# Patient Record
Sex: FEMALE | Race: WHITE | NOT HISPANIC OR LATINO | Employment: OTHER | URBAN - METROPOLITAN AREA
[De-identification: names, ages, dates, MRNs, and addresses within clinical notes are randomized per-mention and may not be internally consistent; named-entity substitution may affect disease eponyms.]

---

## 2016-07-11 LAB
A1C: 6.2
CHOL/HDLC RATIO: 2.2
CHOLESTEROL, TOTAL: 104
HDLC SERPL-MCNC: 46 MG/DL
LDLC SERPL CALC-MCNC: 25 MG/DL
NON HDL CHOL. (LDL+VLDL): 57
TRIGL SERPL-MCNC: 158 MG/DL

## 2017-01-01 ENCOUNTER — HOSPITAL ENCOUNTER (OUTPATIENT)
Dept: RADIOLOGY | Facility: HOSPITAL | Age: 77
Discharge: HOME | End: 2017-10-03
Attending: INTERNAL MEDICINE
Payer: MEDICARE

## 2017-01-01 ENCOUNTER — TELEPHONE (OUTPATIENT)
Dept: FAMILY MEDICINE | Facility: CLINIC | Age: 77
End: 2017-01-01

## 2017-01-01 ENCOUNTER — HOSPITAL ENCOUNTER (OUTPATIENT)
Dept: RADIOLOGY | Facility: HOSPITAL | Age: 77
Discharge: HOME | End: 2017-09-18
Attending: INTERNAL MEDICINE
Payer: MEDICARE

## 2017-01-01 ENCOUNTER — TELEPHONE (OUTPATIENT)
Dept: ADMINISTRATIVE | Facility: HOSPITAL | Age: 77
End: 2017-01-01

## 2017-01-01 ENCOUNTER — LAB VISIT (OUTPATIENT)
Dept: LAB | Facility: HOSPITAL | Age: 77
End: 2017-01-01
Attending: INTERNAL MEDICINE
Payer: MEDICARE

## 2017-01-01 ENCOUNTER — HOSPITAL ENCOUNTER (OUTPATIENT)
Dept: RADIOLOGY | Facility: HOSPITAL | Age: 77
Discharge: HOME | End: 2017-09-07
Attending: NURSE PRACTITIONER
Payer: MEDICARE

## 2017-01-01 ENCOUNTER — OFFICE VISIT (OUTPATIENT)
Dept: NEPHROLOGY | Facility: CLINIC | Age: 77
End: 2017-01-01
Payer: MEDICARE

## 2017-01-01 ENCOUNTER — HOSPITAL ENCOUNTER (OUTPATIENT)
Dept: RADIOLOGY | Facility: HOSPITAL | Age: 77
Discharge: HOME | End: 2017-08-02
Attending: FAMILY MEDICINE
Payer: MEDICARE

## 2017-01-01 ENCOUNTER — OFFICE VISIT (OUTPATIENT)
Dept: FAMILY MEDICINE | Facility: CLINIC | Age: 77
End: 2017-01-01
Payer: MEDICARE

## 2017-01-01 ENCOUNTER — TELEPHONE (OUTPATIENT)
Dept: NEPHROLOGY | Facility: CLINIC | Age: 77
End: 2017-01-01

## 2017-01-01 ENCOUNTER — OFFICE VISIT (OUTPATIENT)
Dept: UROLOGY | Facility: CLINIC | Age: 77
End: 2017-01-01
Payer: MEDICARE

## 2017-01-01 ENCOUNTER — PATIENT OUTREACH (OUTPATIENT)
Dept: ADMINISTRATIVE | Facility: HOSPITAL | Age: 77
End: 2017-01-01

## 2017-01-01 VITALS
DIASTOLIC BLOOD PRESSURE: 70 MMHG | OXYGEN SATURATION: 98 % | SYSTOLIC BLOOD PRESSURE: 150 MMHG | TEMPERATURE: 98 F | HEIGHT: 60 IN | WEIGHT: 206.38 LBS | HEART RATE: 71 BPM | RESPIRATION RATE: 20 BRPM | BODY MASS INDEX: 40.52 KG/M2

## 2017-01-01 VITALS
HEIGHT: 60 IN | DIASTOLIC BLOOD PRESSURE: 68 MMHG | BODY MASS INDEX: 39.82 KG/M2 | SYSTOLIC BLOOD PRESSURE: 122 MMHG | WEIGHT: 202.81 LBS | HEART RATE: 74 BPM

## 2017-01-01 VITALS
SYSTOLIC BLOOD PRESSURE: 146 MMHG | OXYGEN SATURATION: 97 % | HEART RATE: 76 BPM | DIASTOLIC BLOOD PRESSURE: 72 MMHG | WEIGHT: 202.81 LBS | HEIGHT: 60 IN | BODY MASS INDEX: 39.82 KG/M2

## 2017-01-01 VITALS
HEIGHT: 60 IN | BODY MASS INDEX: 39.99 KG/M2 | HEART RATE: 67 BPM | OXYGEN SATURATION: 97 % | DIASTOLIC BLOOD PRESSURE: 76 MMHG | WEIGHT: 203.69 LBS | SYSTOLIC BLOOD PRESSURE: 130 MMHG

## 2017-01-01 VITALS
SYSTOLIC BLOOD PRESSURE: 130 MMHG | HEART RATE: 66 BPM | RESPIRATION RATE: 20 BRPM | BODY MASS INDEX: 40.52 KG/M2 | DIASTOLIC BLOOD PRESSURE: 78 MMHG | HEIGHT: 60 IN | WEIGHT: 206.38 LBS

## 2017-01-01 DIAGNOSIS — Z13.820 OSTEOPOROSIS SCREENING: ICD-10-CM

## 2017-01-01 DIAGNOSIS — N28.89 RENAL MASS: Primary | ICD-10-CM

## 2017-01-01 DIAGNOSIS — N28.89 RENAL MASS: ICD-10-CM

## 2017-01-01 DIAGNOSIS — N18.30 CKD (CHRONIC KIDNEY DISEASE) STAGE 3, GFR 30-59 ML/MIN: ICD-10-CM

## 2017-01-01 DIAGNOSIS — E11.00 TYPE 2 DIABETES MELLITUS WITH HYPEROSMOLARITY WITHOUT COMA, UNSPECIFIED LONG TERM INSULIN USE STATUS: Primary | ICD-10-CM

## 2017-01-01 DIAGNOSIS — N18.30 CKD (CHRONIC KIDNEY DISEASE) STAGE 3, GFR 30-59 ML/MIN: Primary | ICD-10-CM

## 2017-01-01 DIAGNOSIS — I10 ESSENTIAL HYPERTENSION: ICD-10-CM

## 2017-01-01 DIAGNOSIS — M79.671 RIGHT FOOT PAIN: Primary | ICD-10-CM

## 2017-01-01 DIAGNOSIS — E66.01 MORBID OBESITY DUE TO EXCESS CALORIES: ICD-10-CM

## 2017-01-01 DIAGNOSIS — I10 ESSENTIAL HYPERTENSION: Primary | ICD-10-CM

## 2017-01-01 DIAGNOSIS — M79.671 RIGHT FOOT PAIN: ICD-10-CM

## 2017-01-01 DIAGNOSIS — E79.0 ELEVATED URIC ACID IN BLOOD: Primary | ICD-10-CM

## 2017-01-01 DIAGNOSIS — E11.9 TYPE 2 DIABETES MELLITUS WITHOUT COMPLICATION, WITHOUT LONG-TERM CURRENT USE OF INSULIN: ICD-10-CM

## 2017-01-01 DIAGNOSIS — Z13.5 DIABETIC RETINOPATHY SCREENING: ICD-10-CM

## 2017-01-01 DIAGNOSIS — E11.9 TYPE 2 DIABETES MELLITUS WITHOUT COMPLICATION: ICD-10-CM

## 2017-01-01 DIAGNOSIS — N28.1 COMPLEX RENAL CYST: ICD-10-CM

## 2017-01-01 DIAGNOSIS — E11.42 DIABETIC POLYNEUROPATHY ASSOCIATED WITH TYPE 2 DIABETES MELLITUS: ICD-10-CM

## 2017-01-01 DIAGNOSIS — E11.21 TYPE 2 DIABETES MELLITUS WITH DIABETIC NEPHROPATHY, WITHOUT LONG-TERM CURRENT USE OF INSULIN: ICD-10-CM

## 2017-01-01 DIAGNOSIS — N28.89 RIGHT RENAL MASS: Primary | ICD-10-CM

## 2017-01-01 LAB
A1C: 5.7
ANION GAP SERPL CALC-SCNC: 7 MMOL/L
BILIRUB SERPL-MCNC: NORMAL MG/DL
BLOOD URINE, POC: NORMAL
BUN SERPL-MCNC: 33 MG/DL
CALCIUM SERPL-MCNC: 9.7 MG/DL
CHLORIDE SERPL-SCNC: 105 MMOL/L
CHOL/HDLC RATIO: 2.4
CHOLESTEROL, TOTAL: 115
CO2 SERPL-SCNC: 28 MMOL/L
COLOR, POC UA: YELLOW
CREAT SERPL-MCNC: 1.3 MG/DL
CREATININE RANDOM URINE: 91 MG/DL
EST. GFR  (AFRICAN AMERICAN): 45.4 ML/MIN/1.73 M^2
EST. GFR  (NON AFRICAN AMERICAN): 39.4 ML/MIN/1.73 M^2
GLUCOSE SERPL-MCNC: 124 MG/DL
GLUCOSE UR QL STRIP: NORMAL
HDLC SERPL-MCNC: 48 MG/DL
KETONES UR QL STRIP: NORMAL
LDLC SERPL CALC-MCNC: 44 MG/DL
LEUKOCYTE ESTERASE URINE, POC: NORMAL
MICROALBUMIN URINE: 0.7
MICROALBUMIN/CREATININE RATIO: 8
NITRITE, POC UA: NORMAL
NON HDL CHOL. (LDL+VLDL): 67
PH, POC UA: 5
POTASSIUM SERPL-SCNC: 4.1 MMOL/L
PROTEIN, POC: NORMAL
SODIUM SERPL-SCNC: 140 MMOL/L
SPECIFIC GRAVITY, POC UA: 1.01
TRIGLYCERIDE (LIPID PAN): 156
UROBILINOGEN, POC UA: NORMAL

## 2017-01-01 PROCEDURE — 1159F MED LIST DOCD IN RCRD: CPT | Mod: S$GLB,,, | Performed by: FAMILY MEDICINE

## 2017-01-01 PROCEDURE — 99499 UNLISTED E&M SERVICE: CPT | Mod: S$PBB,,, | Performed by: INTERNAL MEDICINE

## 2017-01-01 PROCEDURE — 1125F AMNT PAIN NOTED PAIN PRSNT: CPT | Mod: S$GLB,,, | Performed by: INTERNAL MEDICINE

## 2017-01-01 PROCEDURE — 77080 DXA BONE DENSITY AXIAL: CPT | Mod: TC,PO

## 2017-01-01 PROCEDURE — 36415 COLL VENOUS BLD VENIPUNCTURE: CPT | Mod: PO

## 2017-01-01 PROCEDURE — 99999 PR PBB SHADOW E&M-EST. PATIENT-LVL IV: CPT | Mod: PBBFAC,,, | Performed by: NURSE PRACTITIONER

## 2017-01-01 PROCEDURE — 1126F AMNT PAIN NOTED NONE PRSNT: CPT | Mod: S$GLB,,, | Performed by: FAMILY MEDICINE

## 2017-01-01 PROCEDURE — 73630 X-RAY EXAM OF FOOT: CPT | Mod: TC,PO,RT

## 2017-01-01 PROCEDURE — 3077F SYST BP >= 140 MM HG: CPT | Mod: S$GLB,,, | Performed by: INTERNAL MEDICINE

## 2017-01-01 PROCEDURE — 99203 OFFICE O/P NEW LOW 30 MIN: CPT | Mod: 25,S$GLB,, | Performed by: UROLOGY

## 2017-01-01 PROCEDURE — 99499 UNLISTED E&M SERVICE: CPT | Mod: S$PBB,,, | Performed by: UROLOGY

## 2017-01-01 PROCEDURE — 25500020 PHARM REV CODE 255: Mod: PO | Performed by: INTERNAL MEDICINE

## 2017-01-01 PROCEDURE — 99214 OFFICE O/P EST MOD 30 MIN: CPT | Mod: S$GLB,,, | Performed by: INTERNAL MEDICINE

## 2017-01-01 PROCEDURE — 3078F DIAST BP <80 MM HG: CPT | Mod: S$GLB,,, | Performed by: NURSE PRACTITIONER

## 2017-01-01 PROCEDURE — 77080 DXA BONE DENSITY AXIAL: CPT | Mod: 26,,, | Performed by: RADIOLOGY

## 2017-01-01 PROCEDURE — 3074F SYST BP LT 130 MM HG: CPT | Mod: S$GLB,,, | Performed by: INTERNAL MEDICINE

## 2017-01-01 PROCEDURE — 99499 UNLISTED E&M SERVICE: CPT | Mod: S$PBB,,, | Performed by: FAMILY MEDICINE

## 2017-01-01 PROCEDURE — 3008F BODY MASS INDEX DOCD: CPT | Mod: S$GLB,,, | Performed by: NURSE PRACTITIONER

## 2017-01-01 PROCEDURE — 3078F DIAST BP <80 MM HG: CPT | Mod: S$GLB,,, | Performed by: INTERNAL MEDICINE

## 2017-01-01 PROCEDURE — 81002 URINALYSIS NONAUTO W/O SCOPE: CPT | Mod: S$GLB,,, | Performed by: UROLOGY

## 2017-01-01 PROCEDURE — 74178 CT ABD&PLV WO CNTR FLWD CNTR: CPT | Mod: TC,PO

## 2017-01-01 PROCEDURE — 73630 X-RAY EXAM OF FOOT: CPT | Mod: 26,RT,, | Performed by: RADIOLOGY

## 2017-01-01 PROCEDURE — 99999 PR PBB SHADOW E&M-EST. PATIENT-LVL III: CPT | Mod: PBBFAC,,, | Performed by: FAMILY MEDICINE

## 2017-01-01 PROCEDURE — 99999 PR PBB SHADOW E&M-EST. PATIENT-LVL III: CPT | Mod: PBBFAC,,, | Performed by: UROLOGY

## 2017-01-01 PROCEDURE — 99999 PR PBB SHADOW E&M-EST. PATIENT-LVL IV: CPT | Mod: PBBFAC,,, | Performed by: INTERNAL MEDICINE

## 2017-01-01 PROCEDURE — 74178 CT ABD&PLV WO CNTR FLWD CNTR: CPT | Mod: 26,,, | Performed by: RADIOLOGY

## 2017-01-01 PROCEDURE — 99213 OFFICE O/P EST LOW 20 MIN: CPT | Mod: S$GLB,,, | Performed by: NURSE PRACTITIONER

## 2017-01-01 PROCEDURE — 80048 BASIC METABOLIC PNL TOTAL CA: CPT

## 2017-01-01 PROCEDURE — 99214 OFFICE O/P EST MOD 30 MIN: CPT | Mod: S$GLB,,, | Performed by: FAMILY MEDICINE

## 2017-01-01 PROCEDURE — 1159F MED LIST DOCD IN RCRD: CPT | Mod: S$GLB,,, | Performed by: INTERNAL MEDICINE

## 2017-01-01 PROCEDURE — 3008F BODY MASS INDEX DOCD: CPT | Mod: S$GLB,,, | Performed by: INTERNAL MEDICINE

## 2017-01-01 PROCEDURE — 1159F MED LIST DOCD IN RCRD: CPT | Mod: S$GLB,,, | Performed by: NURSE PRACTITIONER

## 2017-01-01 PROCEDURE — 76770 US EXAM ABDO BACK WALL COMP: CPT | Mod: 26,,, | Performed by: RADIOLOGY

## 2017-01-01 PROCEDURE — 3075F SYST BP GE 130 - 139MM HG: CPT | Mod: S$GLB,,, | Performed by: NURSE PRACTITIONER

## 2017-01-01 PROCEDURE — 1125F AMNT PAIN NOTED PAIN PRSNT: CPT | Mod: S$GLB,,, | Performed by: NURSE PRACTITIONER

## 2017-01-01 PROCEDURE — 76770 US EXAM ABDO BACK WALL COMP: CPT | Mod: TC,PO

## 2017-01-01 PROCEDURE — 1126F AMNT PAIN NOTED NONE PRSNT: CPT | Mod: S$GLB,,, | Performed by: INTERNAL MEDICINE

## 2017-01-01 RX ORDER — COLCHICINE 0.6 MG/1
TABLET ORAL
Qty: 30 TABLET | Refills: 4 | Status: SHIPPED | OUTPATIENT
Start: 2017-01-01 | End: 2018-01-01

## 2017-01-01 RX ORDER — PIOGLITAZONEHYDROCHLORIDE 15 MG/1
TABLET ORAL
Qty: 90 TABLET | Refills: 1 | Status: SHIPPED | OUTPATIENT
Start: 2017-01-01

## 2017-01-01 RX ORDER — SIMVASTATIN 40 MG/1
TABLET, FILM COATED ORAL
Qty: 90 TABLET | Refills: 2 | Status: SHIPPED | OUTPATIENT
Start: 2017-01-01 | End: 2018-01-01

## 2017-01-01 RX ORDER — IBUPROFEN 400 MG/1
400 TABLET ORAL EVERY 6 HOURS PRN
Status: ON HOLD | COMMUNITY
End: 2017-01-01 | Stop reason: HOSPADM

## 2017-01-01 RX ORDER — HYDRALAZINE HYDROCHLORIDE 10 MG/1
TABLET, FILM COATED ORAL
Qty: 180 TABLET | Refills: 1 | Status: ON HOLD | OUTPATIENT
Start: 2017-01-01 | End: 2017-01-01 | Stop reason: HOSPADM

## 2017-01-01 RX ORDER — HYDRALAZINE HYDROCHLORIDE 10 MG/1
TABLET, FILM COATED ORAL
Qty: 60 TABLET | Refills: 0 | Status: SHIPPED | OUTPATIENT
Start: 2017-01-01 | End: 2017-01-01 | Stop reason: SDUPTHER

## 2017-01-01 RX ORDER — METFORMIN HYDROCHLORIDE 500 MG/1
TABLET ORAL
Qty: 270 TABLET | Refills: 1 | Status: SHIPPED | OUTPATIENT
Start: 2017-01-01 | End: 2017-01-01

## 2017-01-01 RX ORDER — NADOLOL 40 MG/1
TABLET ORAL
Qty: 135 TABLET | Refills: 1 | Status: SHIPPED | OUTPATIENT
Start: 2017-01-01

## 2017-01-01 RX ORDER — ALLOPURINOL 100 MG/1
100 TABLET ORAL DAILY
Qty: 30 TABLET | Refills: 4 | Status: SHIPPED | OUTPATIENT
Start: 2017-01-01

## 2017-01-01 RX ORDER — ESCITALOPRAM OXALATE 10 MG/1
TABLET ORAL
Qty: 90 TABLET | Refills: 1 | Status: ON HOLD | OUTPATIENT
Start: 2017-01-01 | End: 2017-01-01 | Stop reason: HOSPADM

## 2017-01-01 RX ORDER — POTASSIUM CHLORIDE 750 MG/1
TABLET, EXTENDED RELEASE ORAL
Qty: 90 TABLET | Refills: 0 | Status: SHIPPED | OUTPATIENT
Start: 2017-01-01

## 2017-01-01 RX ORDER — MELOXICAM 15 MG/1
15 TABLET ORAL DAILY
Qty: 10 TABLET | Refills: 0 | Status: SHIPPED | OUTPATIENT
Start: 2017-01-01 | End: 2017-01-01

## 2017-01-01 RX ORDER — GLIPIZIDE 5 MG/1
TABLET, FILM COATED, EXTENDED RELEASE ORAL
COMMUNITY
Start: 2017-07-07 | End: 2017-01-01 | Stop reason: SDUPTHER

## 2017-01-01 RX ADMIN — IOHEXOL 30 ML: 350 INJECTION, SOLUTION INTRAVENOUS at 09:10

## 2017-01-01 RX ADMIN — IOHEXOL 100 ML: 350 INJECTION, SOLUTION INTRAVENOUS at 09:10

## 2017-01-06 RX ORDER — POTASSIUM CHLORIDE 750 MG/1
TABLET, EXTENDED RELEASE ORAL
Qty: 90 TABLET | Refills: 0 | Status: SHIPPED | OUTPATIENT
Start: 2017-01-06 | End: 2017-04-08 | Stop reason: SDUPTHER

## 2017-01-06 RX ORDER — INDAPAMIDE 2.5 MG/1
TABLET ORAL
Qty: 180 TABLET | Refills: 0 | Status: SHIPPED | OUTPATIENT
Start: 2017-01-06 | End: 2017-01-01

## 2017-01-09 RX ORDER — METFORMIN HYDROCHLORIDE 500 MG/1
TABLET ORAL
Qty: 270 TABLET | Refills: 0 | Status: SHIPPED | OUTPATIENT
Start: 2017-01-09 | End: 2017-01-01 | Stop reason: SDUPTHER

## 2017-02-02 RX ORDER — SIMVASTATIN 40 MG/1
TABLET, FILM COATED ORAL
Qty: 90 TABLET | Refills: 0 | Status: SHIPPED | OUTPATIENT
Start: 2017-02-02 | End: 2017-05-01 | Stop reason: SDUPTHER

## 2017-02-14 RX ORDER — NADOLOL 40 MG/1
TABLET ORAL
Qty: 135 TABLET | Refills: 0 | Status: SHIPPED | OUTPATIENT
Start: 2017-02-14 | End: 2017-05-18 | Stop reason: SDUPTHER

## 2017-02-25 RX ORDER — PIOGLITAZONEHYDROCHLORIDE 15 MG/1
TABLET ORAL
Qty: 90 TABLET | Refills: 0 | Status: SHIPPED | OUTPATIENT
Start: 2017-02-25 | End: 2017-05-27 | Stop reason: SDUPTHER

## 2017-03-02 ENCOUNTER — OFFICE VISIT (OUTPATIENT)
Dept: FAMILY MEDICINE | Facility: CLINIC | Age: 77
End: 2017-03-02
Payer: MEDICARE

## 2017-03-02 VITALS
BODY MASS INDEX: 40.78 KG/M2 | SYSTOLIC BLOOD PRESSURE: 138 MMHG | OXYGEN SATURATION: 98 % | WEIGHT: 207.69 LBS | DIASTOLIC BLOOD PRESSURE: 72 MMHG | HEIGHT: 60 IN | HEART RATE: 70 BPM

## 2017-03-02 DIAGNOSIS — J32.9 SINUSITIS, UNSPECIFIED CHRONICITY, UNSPECIFIED LOCATION: Primary | ICD-10-CM

## 2017-03-02 PROCEDURE — 1126F AMNT PAIN NOTED NONE PRSNT: CPT | Mod: S$GLB,,, | Performed by: NURSE PRACTITIONER

## 2017-03-02 PROCEDURE — 1159F MED LIST DOCD IN RCRD: CPT | Mod: S$GLB,,, | Performed by: NURSE PRACTITIONER

## 2017-03-02 PROCEDURE — 1157F ADVNC CARE PLAN IN RCRD: CPT | Mod: S$GLB,,, | Performed by: NURSE PRACTITIONER

## 2017-03-02 PROCEDURE — 99999 PR PBB SHADOW E&M-EST. PATIENT-LVL V: CPT | Mod: PBBFAC,,, | Performed by: NURSE PRACTITIONER

## 2017-03-02 PROCEDURE — 3078F DIAST BP <80 MM HG: CPT | Mod: S$GLB,,, | Performed by: NURSE PRACTITIONER

## 2017-03-02 PROCEDURE — 99213 OFFICE O/P EST LOW 20 MIN: CPT | Mod: S$GLB,,, | Performed by: NURSE PRACTITIONER

## 2017-03-02 PROCEDURE — 3075F SYST BP GE 130 - 139MM HG: CPT | Mod: S$GLB,,, | Performed by: NURSE PRACTITIONER

## 2017-03-02 PROCEDURE — 1160F RVW MEDS BY RX/DR IN RCRD: CPT | Mod: S$GLB,,, | Performed by: NURSE PRACTITIONER

## 2017-03-02 RX ORDER — LEVOCETIRIZINE DIHYDROCHLORIDE 5 MG/1
5 TABLET, FILM COATED ORAL NIGHTLY
Qty: 30 TABLET | Refills: 11 | Status: SHIPPED | OUTPATIENT
Start: 2017-03-02 | End: 2017-05-09

## 2017-03-02 RX ORDER — AMOXICILLIN 500 MG/1
500 CAPSULE ORAL 3 TIMES DAILY
Qty: 30 CAPSULE | Refills: 0 | Status: SHIPPED | OUTPATIENT
Start: 2017-03-02 | End: 2017-03-12

## 2017-03-02 RX ORDER — BENZONATATE 200 MG/1
200 CAPSULE ORAL 3 TIMES DAILY PRN
Qty: 30 CAPSULE | Refills: 0 | Status: SHIPPED | OUTPATIENT
Start: 2017-03-02 | End: 2017-03-12

## 2017-03-02 NOTE — PROGRESS NOTES
Subjective:       Patient ID: Delaney Stephenson is a 77 y.o. female.    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  , Disp: , Rfl:   clonazePAM (KLONOPIN) 0.5 MG tablet, TAKE 1/2 TABLET BY MOUTH TWICE DAILY, Disp: 30 tablet, Rfl: 2  escitalopram oxalate (LEXAPRO) 10 MG tablet, Take 1 tablet (10 mg total) by mouth once daily., Disp: 90 tablet, Rfl: 1  escitalopram oxalate (LEXAPRO) 10 MG tablet, TAKE 1 TABLET BY MOUTH EVERY DAY, Disp: 90 tablet, Rfl: 0  fluticasone (FLONASE) 50 mcg/actuation nasal spray, USE TWO SPRAYS IN EACH NOSTRIL AS NEEDED, Disp: 3 Bottle, Rfl: 3  glipiZIDE (GLUCOTROL) 5 MG tablet, Take 5 mg by mouth 2 (two) times daily before meals. EXTENDED RELEASE, Disp: , Rfl:   glipiZIDE (GLUCOTROL) 5 MG TR24, TK 1 T PO QD, Disp: , Rfl: 1  hydrALAZINE (APRESOLINE) 10 MG tablet, TAKE 1 TABLET BY MOUTH TWICE DAILY, Disp: 60 tablet, Rfl: 3  indapamide (LOZOL) 2.5 MG Tab, TAKE 1 TABLET BY MOUTH TWICE DAILY, Disp: 180 tablet, Rfl: 1  indapamide (LOZOL) 2.5 MG Tab, TAKE 1 TABLET BY MOUTH TWICE DAILY, Disp: 180 tablet, Rfl: 0  metformin (GLUCOPHAGE) 500 MG tablet, TAKE 1 TABLET BY MOUTH THREE TIMES DAILY WITH MEALS, Disp: 270 tablet,  Rfl: 0  montelukast (SINGULAIR) 10 mg tablet, Take 1 tablet (10 mg total) by mouth every evening., Disp: 90 tablet, Rfl: 1  nadolol (CORGARD) 40 MG tablet, Take 1.5 tablets (60 mg total) by mouth once daily., Disp: 135 tablet, Rfl: 1  nadolol (CORGARD) 40 MG tablet, TAKE 1& 1/2 TABLETS BY MOUTH EVERY DAY, Disp: 135 tablet, Rfl: 0  pioglitazone (ACTOS) 15 MG tablet, TAKE 1 TABLET BY MOUTH EVERY DAY, Disp: 90 tablet, Rfl: 0  potassium chloride SA (K-DUR,KLOR-CON) 10 MEQ tablet, TAKE 1 TABLET BY MOUTH ONCE DAILY, Disp: 90 tablet, Rfl: 0  ranitidine (ZANTAC) 150 MG tablet, Take 1 tablet (150 mg total) by mouth 2 (two) times daily., Disp: 180 tablet, Rfl: 1  ranitidine (ZANTAC) 150 MG tablet, TAKE 1 TABLET BY MOUTH TWICE DAILY, Disp: 180 tablet, Rfl: 1  SENNA/FENNEL (NATURAL VEGETABLE LAXATIVE ORAL), Take 3 tablets by mouth 2 (two) times daily., Disp: , Rfl:   simvastatin (ZOCOR) 40 MG tablet, Take 1 tablet (40 mg total) by mouth every evening., Disp: 90 tablet, Rfl: 1  simvastatin (ZOCOR) 40 MG tablet, TAKE 1 TABLET BY MOUTH EVERY EVENING, Disp: 90 tablet, Rfl: 0  triamcinolone acetonide 0.1% (KENALOG) 0.1 % cream, Apply topically 2 (two) times daily., Disp: 60 g, Rfl: 3  (DISCONTINUED) hydrALAZINE (APRESOLINE) 10 MG tablet, TAKE 1 TABLET(10 MG) BY MOUTH TWICE DAILY, Disp: 180 tablet, Rfl: 0    No current facility-administered medications on file prior to visit.     Social History    Marital status:              Spouse name:                       Years of education:                 Number of children:               Occupational History    None on file    Social History Main Topics    Smoking status: Never Smoker                                                                Smokeless status: Never Used                        Alcohol use: Not on file     Drug use: Not on file     Sexual activity: Not on file          Other Topics            Concern    None on file    Social History Narrative    None on  file      Review of patient's family history indicates:    Heart disease                  Mother                          Sinus Problem   This is a new problem. Episode onset: x 12 days. The problem has been gradually worsening since onset. There has been no fever. Associated symptoms include congestion, coughing (productive, thick, yellow), headaches, a hoarse voice, sinus pressure (yellow mucus) and sneezing. Pertinent negatives include no chills, diaphoresis, ear pain, neck pain, shortness of breath, sore throat or swollen glands. Treatments tried: flonase. The treatment provided mild relief.     Review of Systems   Constitutional: Negative for chills and diaphoresis.   HENT: Positive for congestion, hoarse voice, postnasal drip, rhinorrhea, sinus pressure (yellow mucus) and sneezing. Negative for ear pain and sore throat.    Respiratory: Positive for cough (productive, thick, yellow). Negative for chest tightness and shortness of breath.    Cardiovascular: Negative.    Gastrointestinal: Negative.  Negative for constipation, diarrhea, nausea and vomiting.   Genitourinary: Negative.    Musculoskeletal: Negative for neck pain.   Neurological: Positive for headaches. Negative for dizziness.       Objective:      Physical Exam   Constitutional: She is oriented to person, place, and time. No distress.   HENT:   Head: Normocephalic and atraumatic. Head is without raccoon's eyes.   Right Ear: A middle ear effusion is present.   Left Ear: A middle ear effusion is present.   Nose: Mucosal edema and rhinorrhea present. Right sinus exhibits frontal sinus tenderness. Right sinus exhibits no maxillary sinus tenderness. Left sinus exhibits frontal sinus tenderness. Left sinus exhibits no maxillary sinus tenderness.   Mouth/Throat: Uvula is midline. No posterior oropharyngeal erythema.   Eyes: Pupils are equal, round, and reactive to light.   Neck: Normal range of motion.   Cardiovascular: Normal rate and regular rhythm.     Pulmonary/Chest: Effort normal and breath sounds normal. No respiratory distress. She has no wheezes.   Abdominal: Soft. Bowel sounds are normal. She exhibits no distension. There is no tenderness.   Musculoskeletal: She exhibits no edema.   Neurological: She is alert and oriented to person, place, and time.   Skin: She is not diaphoretic.   Psychiatric: She has a normal mood and affect.   Vitals reviewed.      Assessment:       1. Sinusitis, unspecified chronicity, unspecified location        Plan:       1. Sinusitis, unspecified chronicity, unspecified location  Continue flonase daily. Follow up if not resolving.   - levocetirizine (XYZAL) 5 MG tablet; Take 1 tablet (5 mg total) by mouth every evening.  Dispense: 30 tablet; Refill: 11  - amoxicillin (AMOXIL) 500 MG capsule; Take 1 capsule (500 mg total) by mouth 3 (three) times daily.  Dispense: 30 capsule; Refill: 0  - benzonatate (TESSALON) 200 MG capsule; Take 1 capsule (200 mg total) by mouth 3 (three) times daily as needed for Cough.  Dispense: 30 capsule; Refill: 0

## 2017-03-02 NOTE — MR AVS SNAPSHOT
[facility]  [address] [facility]  [facility] [address]  Phone: [phone]  Fax: [phone]                  Delaney Stephenson   3/2/2017 1:00 PM   Office Visit    Description:  Female : 1940   Provider:  Michelle Pa NP   Department:  [facility]           Reason for Visit     Sinus Problem     Cough           Diagnoses this Visit        Comments    Sinusitis, unspecified chronicity, unspecified location    -  Primary            To Do List           Goals (5 Years of Data)     None       These Medications        Disp Refills Start End    levocetirizine (XYZAL) 5 MG tablet 30 tablet 11 3/2/2017 3/2/2018    Take 1 tablet (5 mg total) by mouth every evening. - Oral    Pharmacy: [facility] [address] Ph #: [phone]       amoxicillin (AMOXIL) 500 MG capsule 30 capsule 0 3/2/2017 3/12/2017    Take 1 capsule (500 mg total) by mouth 3 (three) times daily. - Oral    Pharmacy: [facility] [place] [address] [facility] [address] Ph #: [phone]       benzonatate (TESSALON) 200 MG capsule 30 capsule 0 3/2/2017 3/12/2017    Take 1 capsule (200 mg total) by mouth 3 (three) times daily as needed for Cough. - Oral    Pharmacy: [facility] [place] [address] [facility] [address] Ph #: [phone]         [facility]     [facility] Nurse Care Line -  Assistance  Registered nurses in the [facility] provide clinical advisement, health education, appointment booking, and other advisory services.  Call for this free service at [phone].             Medications           Message regarding Medications     Verify the changes and/or additions to your medication regime listed below are the same as discussed with your clinician today.  If any of these changes or additions are incorrect, please notify your  healthcare provider.        START taking these NEW medications        Refills    levocetirizine (XYZAL) 5 MG tablet 11    Sig: Take 1 tablet (5 mg total) by mouth every evening.    Class: Print    Route: Oral    amoxicillin (AMOXIL) 500 MG capsule 0    Sig: Take 1 capsule (500 mg total) by mouth 3 (three) times daily.    Class: Print    Route: Oral    benzonatate (TESSALON) 200 MG capsule 0    Sig: Take 1 capsule (200 mg total) by mouth 3 (three) times daily as needed for Cough.    Class: Print    Route: Oral           Verify that the below list of medications is an accurate representation of the medications you are currently taking.  If none reported, the list may be blank. If incorrect, please contact your healthcare provider. Carry this list with you in case of emergency.           Current Medications     aspirin (ECOTRIN) 81 MG EC tablet Take 81 mg by mouth once daily.      clonazePAM (KLONOPIN) 0.5 MG tablet TAKE 1/2 TABLET BY MOUTH TWICE DAILY    escitalopram oxalate (LEXAPRO) 10 MG tablet Take 1 tablet (10 mg total) by mouth once daily.    escitalopram oxalate (LEXAPRO) 10 MG tablet TAKE 1 TABLET BY MOUTH EVERY DAY    fluticasone (FLONASE) 50 mcg/actuation nasal spray USE TWO SPRAYS IN EACH NOSTRIL AS NEEDED    glipiZIDE (GLUCOTROL) 5 MG tablet Take 5 mg by mouth 2 (two) times daily before meals. EXTENDED RELEASE    glipiZIDE (GLUCOTROL) 5 MG TR24 TK 1 T PO QD    hydrALAZINE (APRESOLINE) 10 MG tablet TAKE 1 TABLET BY MOUTH TWICE DAILY    indapamide (LOZOL) 2.5 MG Tab TAKE 1 TABLET BY MOUTH TWICE DAILY    indapamide (LOZOL) 2.5 MG Tab TAKE 1 TABLET BY MOUTH TWICE DAILY    metformin (GLUCOPHAGE) 500 MG tablet TAKE 1 TABLET BY MOUTH THREE TIMES DAILY WITH MEALS    montelukast (SINGULAIR) 10 mg tablet Take 1 tablet (10 mg total) by mouth every evening.    nadolol (CORGARD) 40 MG tablet Take 1.5 tablets (60 mg total) by mouth once daily.    nadolol (CORGARD) 40 MG tablet TAKE 1& 1/2 TABLETS BY MOUTH EVERY DAY     pioglitazone (ACTOS) 15 MG tablet TAKE 1 TABLET BY MOUTH EVERY DAY    potassium chloride SA (K-DUR,KLOR-CON) 10 MEQ tablet TAKE 1 TABLET BY MOUTH ONCE DAILY    ranitidine (ZANTAC) 150 MG tablet Take 1 tablet (150 mg total) by mouth 2 (two) times daily.    ranitidine (ZANTAC) 150 MG tablet TAKE 1 TABLET BY MOUTH TWICE DAILY    SENNA/FENNEL (NATURAL VEGETABLE LAXATIVE ORAL) Take 3 tablets by mouth 2 (two) times daily.    simvastatin (ZOCOR) 40 MG tablet Take 1 tablet (40 mg total) by mouth every evening.    simvastatin (ZOCOR) 40 MG tablet TAKE 1 TABLET BY MOUTH EVERY EVENING    amoxicillin (AMOXIL) 500 MG capsule Take 1 capsule (500 mg total) by mouth 3 (three) times daily.    benzonatate (TESSALON) 200 MG capsule Take 1 capsule (200 mg total) by mouth 3 (three) times daily as needed for Cough.    levocetirizine (XYZAL) 5 MG tablet Take 1 tablet (5 mg total) by mouth every evening.    triamcinolone acetonide 0.1% (KENALOG) 0.1 % cream Apply topically 2 (two) times daily.           Clinical Reference Information           Your Vitals Were     BP                   138/72           Blood Pressure          Most Recent Value    BP  138/72      Allergies as of 3/2/2017     Codeine    Phenergan [Promethazine]      Immunizations Administered on Date of Encounter - 3/2/2017     None      Language Assistance Services     ATTENTION: Language assistance services are available, free of charge. Please call [phone].      ATENCIÓN: Si habla anthony, tiene a cadena disposición servicios gratuitos de asistencia lingüística. Llame al [phone].     [facility] Ý: N?u b?n nói Ti?ng Vi?t, có các d?ch v? h? tr? ngôn ng? mi?n phí dành cho b?n. G?i s? [phone].         [facility] complies with applicable Federal civil rights laws and does not discriminate on the basis of race, color, national origin, age, disability, or sex.

## 2017-03-14 RX ORDER — ESCITALOPRAM OXALATE 10 MG/1
TABLET ORAL
Qty: 90 TABLET | Refills: 0 | Status: SHIPPED | OUTPATIENT
Start: 2017-03-14 | End: 2017-06-12 | Stop reason: SDUPTHER

## 2017-03-16 RX ORDER — INDAPAMIDE 2.5 MG/1
TABLET ORAL
Qty: 180 TABLET | Refills: 0 | Status: SHIPPED | OUTPATIENT
Start: 2017-03-16 | End: 2017-05-09 | Stop reason: SDUPTHER

## 2017-03-19 RX ORDER — HYDRALAZINE HYDROCHLORIDE 10 MG/1
TABLET, FILM COATED ORAL
Qty: 60 TABLET | Refills: 0 | Status: SHIPPED | OUTPATIENT
Start: 2017-03-19 | End: 2017-04-17 | Stop reason: SDUPTHER

## 2017-03-27 ENCOUNTER — TELEPHONE (OUTPATIENT)
Dept: FAMILY MEDICINE | Facility: CLINIC | Age: 77
End: 2017-03-27

## 2017-03-27 NOTE — TELEPHONE ENCOUNTER
----- Message from Michelle Aparicio sent at 3/24/2017  4:20 PM CDT -----  Contact: self  Patient wants to speak with a nurse regarding a Rx for a bladder infection. Please call back at [phone] (home)

## 2017-04-09 RX ORDER — POTASSIUM CHLORIDE 750 MG/1
TABLET, EXTENDED RELEASE ORAL
Qty: 90 TABLET | Refills: 0 | Status: SHIPPED | OUTPATIENT
Start: 2017-04-09 | End: 2017-07-20 | Stop reason: SDUPTHER

## 2017-04-18 RX ORDER — HYDRALAZINE HYDROCHLORIDE 10 MG/1
TABLET, FILM COATED ORAL
Qty: 60 TABLET | Refills: 3 | Status: SHIPPED | OUTPATIENT
Start: 2017-04-18 | End: 2017-01-01 | Stop reason: SDUPTHER

## 2017-05-01 RX ORDER — SIMVASTATIN 40 MG/1
TABLET, FILM COATED ORAL
Qty: 90 TABLET | Refills: 1 | Status: SHIPPED | OUTPATIENT
Start: 2017-05-01 | End: 2017-01-01 | Stop reason: SDUPTHER

## 2017-05-09 ENCOUNTER — OFFICE VISIT (OUTPATIENT)
Dept: NEPHROLOGY | Facility: CLINIC | Age: 77
End: 2017-05-09
Payer: MEDICARE

## 2017-05-09 VITALS
HEIGHT: 60 IN | DIASTOLIC BLOOD PRESSURE: 62 MMHG | OXYGEN SATURATION: 98 % | BODY MASS INDEX: 42.46 KG/M2 | SYSTOLIC BLOOD PRESSURE: 134 MMHG | WEIGHT: 216.25 LBS | HEART RATE: 69 BPM

## 2017-05-09 DIAGNOSIS — I10 ESSENTIAL HYPERTENSION: ICD-10-CM

## 2017-05-09 DIAGNOSIS — E78.5 HYPERLIPIDEMIA, UNSPECIFIED HYPERLIPIDEMIA TYPE: ICD-10-CM

## 2017-05-09 DIAGNOSIS — E11.21 TYPE 2 DIABETES MELLITUS WITH DIABETIC NEPHROPATHY, WITHOUT LONG-TERM CURRENT USE OF INSULIN: ICD-10-CM

## 2017-05-09 DIAGNOSIS — E66.01 MORBID OBESITY DUE TO EXCESS CALORIES: ICD-10-CM

## 2017-05-09 DIAGNOSIS — N18.30 CKD (CHRONIC KIDNEY DISEASE) STAGE 3, GFR 30-59 ML/MIN: Primary | ICD-10-CM

## 2017-05-09 PROCEDURE — 1159F MED LIST DOCD IN RCRD: CPT | Mod: S$GLB,,, | Performed by: INTERNAL MEDICINE

## 2017-05-09 PROCEDURE — 99999 PR PBB SHADOW E&M-EST. PATIENT-LVL IV: CPT | Mod: PBBFAC,,, | Performed by: INTERNAL MEDICINE

## 2017-05-09 PROCEDURE — 3075F SYST BP GE 130 - 139MM HG: CPT | Mod: S$GLB,,, | Performed by: INTERNAL MEDICINE

## 2017-05-09 PROCEDURE — 99204 OFFICE O/P NEW MOD 45 MIN: CPT | Mod: S$GLB,,, | Performed by: INTERNAL MEDICINE

## 2017-05-09 PROCEDURE — 99499 UNLISTED E&M SERVICE: CPT | Mod: S$PBB,,, | Performed by: INTERNAL MEDICINE

## 2017-05-09 PROCEDURE — 1126F AMNT PAIN NOTED NONE PRSNT: CPT | Mod: S$GLB,,, | Performed by: INTERNAL MEDICINE

## 2017-05-09 PROCEDURE — 1160F RVW MEDS BY RX/DR IN RCRD: CPT | Mod: S$GLB,,, | Performed by: INTERNAL MEDICINE

## 2017-05-09 PROCEDURE — 3078F DIAST BP <80 MM HG: CPT | Mod: S$GLB,,, | Performed by: INTERNAL MEDICINE

## 2017-05-09 RX ORDER — NITROFURANTOIN 25; 75 MG/1; MG/1
CAPSULE ORAL
Refills: 0 | COMMUNITY
Start: 2017-03-25 | End: 2017-05-09

## 2017-05-09 RX ORDER — HYDROCODONE BITARTRATE AND ACETAMINOPHEN 7.5; 325 MG/1; MG/1
TABLET ORAL
Refills: 0 | COMMUNITY
Start: 2017-03-10 | End: 2017-05-09

## 2017-05-09 RX ORDER — AMOXICILLIN AND CLAVULANATE POTASSIUM 875; 125 MG/1; MG/1
TABLET, FILM COATED ORAL
Refills: 0 | COMMUNITY
Start: 2017-03-28 | End: 2017-05-09

## 2017-05-09 NOTE — MR AVS SNAPSHOT
[facility]  [address]  [facility] [address]  Phone: [phone]                  Delaney Stephenson   2017 10:20 AM   Office Visit    Description:  Female : 1940   Provider:  Adonay Asher MD   Department:  [facility]                To Do List           Goals (5 Years of Data)     None      [facility] On Call     [facility] On Call Nurse Care Line -  Assistance  Unless otherwise directed by your provider, please contact [facility], our nurse care line that is available for  assistance.     Registered nurses in the [facility] provide: appointment scheduling, clinical advisement, health education, and other advisory services.  Call: [phone] (toll free)               Medications           Message regarding Medications     Verify the changes and/or additions to your medication regime listed below are the same as discussed with your clinician today.  If any of these changes or additions are incorrect, please notify your healthcare provider.        STOP taking these medications     glipiZIDE (GLUCOTROL) 5 MG TR24 TK 1 T PO QD    amoxicillin-clavulanate 875-125mg (AUGMENTIN) 875-125 mg per tablet TK 1 T PO BID FOR 10 DAYS    hydrocodone-acetaminophen 7.5-325mg (NORCO) 7.5-325 mg per tablet TK 1 T PO Q 4 TO 6 H PRN P    levocetirizine (XYZAL) 5 MG tablet Take 1 tablet (5 mg total) by mouth every evening.    montelukast (SINGULAIR) 10 mg tablet Take 1 tablet (10 mg total) by mouth every evening.    nitrofurantoin, macrocrystal-monohydrate, (MACROBID) 100 MG capsule TK 1 C PO BID FOR 10 DAYS           Verify that the below list of medications is an accurate representation of the medications you are currently taking.  If none reported, the list may be blank. If incorrect, please contact your healthcare provider. Carry this list with you in case of emergency.           Current Medications     aspirin (ECOTRIN) 81 MG EC tablet Take 81 mg by mouth  once daily.      clonazePAM (KLONOPIN) 0.5 MG tablet TAKE 1/2 TABLET BY MOUTH TWICE DAILY    escitalopram oxalate (LEXAPRO) 10 MG tablet TAKE 1 TABLET BY MOUTH EVERY DAY    fluticasone (FLONASE) 50 mcg/actuation nasal spray USE TWO SPRAYS IN EACH NOSTRIL AS NEEDED    glipiZIDE (GLUCOTROL) 5 MG tablet Take 5 mg by mouth daily with breakfast. EXTENDED RELEASE     hydrALAZINE (APRESOLINE) 10 MG tablet TAKE 1 TABLET BY MOUTH TWICE DAILY    indapamide (LOZOL) 2.5 MG Tab TAKE 1 TABLET BY MOUTH TWICE DAILY    metformin (GLUCOPHAGE) 500 MG tablet TAKE 1 TABLET BY MOUTH THREE TIMES DAILY WITH MEALS    nadolol (CORGARD) 40 MG tablet TAKE 1& 1/2 TABLETS BY MOUTH EVERY DAY    pioglitazone (ACTOS) 15 MG tablet TAKE 1 TABLET BY MOUTH EVERY DAY    potassium chloride SA (K-DUR,KLOR-CON) 10 MEQ tablet TAKE 1 TABLET BY MOUTH ONCE DAILY    ranitidine (ZANTAC) 150 MG tablet TAKE 1 TABLET BY MOUTH TWICE DAILY    SENNA/FENNEL (NATURAL VEGETABLE LAXATIVE ORAL) Take 3 tablets by mouth 2 (two) times daily.    simvastatin (ZOCOR) 40 MG tablet TAKE 1 TABLET BY MOUTH EVERY EVENING    triamcinolone acetonide 0.1% (KENALOG) 0.1 % cream Apply topically 2 (two) times daily.           Clinical Reference Information           Your Vitals Were     BP Pulse Height Weight SpO2 BMI    134/62 (BP Location: Left arm, Patient Position: Sitting) 69 5' (1.524 m) 98.1 kg (216 lb 4.3 oz) 98% 42.24 kg/m2      Blood Pressure          Most Recent Value    BP  134/62      Allergies as of 5/9/2017     Codeine    Phenergan [Promethazine]      Immunizations Administered on Date of Encounter - 5/9/2017     None      Language Assistance Services     ATTENTION: Language assistance services are available, free of charge. Please call [phone].      ATENCIÓN: Si marquez cruz, tiene a cadena disposición servicios gratuitos de asistencia lingüística. Llame al [phone].     CHÚ Ý: N?u b?n nói Ti?ng Vi?t, có các d?ch v? h? tr? ngôn ng? mi?n phí dành cho b?n. G?i s?  [phone].         [facility] complies with applicable Federal civil rights laws and does not discriminate on the basis of race, color, national origin, age, disability, or sex.

## 2017-05-12 PROBLEM — E66.01 MORBID OBESITY: Status: ACTIVE | Noted: 2017-05-12

## 2017-05-12 PROBLEM — E11.9 DM TYPE 2 (DIABETES MELLITUS, TYPE 2): Status: ACTIVE | Noted: 2017-05-12

## 2017-05-12 NOTE — PROGRESS NOTES
Subjective:       Patient ID: Delaney Stephenson is a 77 y.o. White female who presents for new evaluation of Chronic Kidney Disease    HPI     She is referred by her Endocrinologist, Dr. Suarez, for CKD    She has a history of DM for 20 years with last Hba1c <7 as well as HTN since the age of 21. She denies foamy urine, hematuria and no history of frequent UTIs. No edema on a daily diuretic and no SOB but mobility is limited. She follows a low sodium diet and feels she stays hydrated. She uses NSAIDs rarely and no herbal medications. She admits to no exercise. No history of PRBCs. Both her brother (nephritis at age 28) and sister (from DM)  from kidney disease. She lives at home with her  and her children live in this area    Review of Systems   Constitutional: Negative for activity change and appetite change.   HENT: Negative for facial swelling.    Eyes: Negative for visual disturbance.   Respiratory: Negative for shortness of breath.    Cardiovascular: Negative for chest pain and leg swelling.   Gastrointestinal: Negative for abdominal distention.   Genitourinary: Negative for difficulty urinating and dysuria.   Musculoskeletal: Negative for arthralgias.   Neurological: Negative for weakness.       Objective:      Physical Exam   Constitutional: She is oriented to person, place, and time. She appears well-nourished. No distress.   HENT:   Mouth/Throat: Oropharynx is clear and moist.   Neck: No JVD present.   Cardiovascular: S1 normal and S2 normal.  Exam reveals no friction rub.    Pulmonary/Chest: Breath sounds normal. She has no wheezes. She has no rales.   Abdominal: Soft.   Musculoskeletal: She exhibits no edema.   Neurological: She is alert and oriented to person, place, and time.   Skin: Skin is warm and dry.   Psychiatric: She has a normal mood and affect.   Vitals reviewed.      Assessment:       1. CKD (chronic kidney disease) stage 3, GFR 30-59 ml/min    2. Essential hypertension    3. Type 2  diabetes mellitus with diabetic nephropathy, without long-term current use of insulin    4. Hyperlipidemia, unspecified hyperlipidemia type    5. Morbid obesity due to excess calories        Plan:             CKD appearing to be at Stage 3 with several risk factors. She will undergo further work up with urine studies, renal u/s and a few select serologies. Unclear why she is not on an ace-i or ARB. Will first perform w/up then have her RTC to review results and to discuss starting a RAAS blocker.     She was advised to continue a low sodium diet, avoid NSAIDs and continue good glycemic control.       Labs and renal u/s then RTC to review results

## 2017-05-18 RX ORDER — NADOLOL 40 MG/1
TABLET ORAL
Qty: 135 TABLET | Refills: 0 | Status: SHIPPED | OUTPATIENT
Start: 2017-05-18 | End: 2017-01-01 | Stop reason: SDUPTHER

## 2017-05-22 ENCOUNTER — TELEPHONE (OUTPATIENT)
Dept: NEPHROLOGY | Facility: CLINIC | Age: 77
End: 2017-05-22

## 2017-05-22 NOTE — TELEPHONE ENCOUNTER
Spoke to the patient and she had rescheduled her u/s to May 29th and will get her blood work at Dr Suarez's office with [facility] June 14th, a week before his appt.  Her follow-up appt to review results was made on July 5th.

## 2017-05-27 RX ORDER — PIOGLITAZONEHYDROCHLORIDE 15 MG/1
TABLET ORAL
Qty: 90 TABLET | Refills: 0 | Status: SHIPPED | OUTPATIENT
Start: 2017-05-27 | End: 2017-01-01 | Stop reason: SDUPTHER

## 2017-05-30 RX ORDER — CLONAZEPAM 0.5 MG/1
TABLET ORAL
Qty: 30 TABLET | Refills: 0 | Status: SHIPPED | OUTPATIENT
Start: 2017-05-30 | End: 2017-07-20 | Stop reason: SDUPTHER

## 2017-06-09 ENCOUNTER — HOSPITAL ENCOUNTER (OUTPATIENT)
Dept: RADIOLOGY | Facility: HOSPITAL | Age: 77
Discharge: HOME | End: 2017-06-09
Attending: INTERNAL MEDICINE
Payer: MEDICARE

## 2017-06-09 DIAGNOSIS — I10 ESSENTIAL HYPERTENSION: ICD-10-CM

## 2017-06-09 DIAGNOSIS — N18.30 CKD (CHRONIC KIDNEY DISEASE) STAGE 3, GFR 30-59 ML/MIN: ICD-10-CM

## 2017-06-09 DIAGNOSIS — E11.21 TYPE 2 DIABETES MELLITUS WITH DIABETIC NEPHROPATHY, WITHOUT LONG-TERM CURRENT USE OF INSULIN: ICD-10-CM

## 2017-06-09 PROCEDURE — 76770 US EXAM ABDO BACK WALL COMP: CPT | Mod: 26,,, | Performed by: RADIOLOGY

## 2017-06-09 PROCEDURE — 76770 US EXAM ABDO BACK WALL COMP: CPT | Mod: TC,PO

## 2017-06-12 RX ORDER — ESCITALOPRAM OXALATE 10 MG/1
TABLET ORAL
Qty: 90 TABLET | Refills: 0 | Status: SHIPPED | OUTPATIENT
Start: 2017-06-12 | End: 2017-01-01 | Stop reason: SDUPTHER

## 2017-06-27 RX ORDER — INDAPAMIDE 2.5 MG/1
TABLET ORAL
Qty: 180 TABLET | Refills: 0 | Status: ON HOLD | OUTPATIENT
Start: 2017-06-27 | End: 2017-01-01 | Stop reason: HOSPADM

## 2017-07-06 ENCOUNTER — PATIENT OUTREACH (OUTPATIENT)
Dept: ADMINISTRATIVE | Facility: HOSPITAL | Age: 77
End: 2017-07-06

## 2017-07-06 NOTE — PROGRESS NOTES
Dm bulk outreach [facility], due some labs for your diabetes, your annual diabetic foot exam, osteoporosis screening, and possibly some immunizations (tetanus, shingles, and pneumonia)

## 2017-07-06 NOTE — LETTER
July 6, 2017    Delaney T Sachin  [address]  [facility] [address]             [facility]  [address]  [facility] [address]  Phone: [phone] Dear Mrs. Stephenson:    [facility] is committed to your overall health.  To help you get the most out of each of your visits, we will review your information to make sure you are up to date on all of your recommended tests and/or procedures.      We have Dr. Manpreet Greene listed as your primary care provider.  He has found that you may be due for some labs for your diabetes, your annual diabetic foot exam, osteoporosis screening, and possibly some immunizations (tetanus, shingles, and pneumonia).     If you have had any of the above done at an outside facility, please let us know so I can update your record.  If you have a copy of these records, please provide a copy for us to scan into your chart.  If not, please provide that provider/facilities contact information so that we may obtain copies from that facility.     Otherwise, please schedule these appointments at your earliest convenience.  You are due for your diabetic follow up with Dr. Greene in September of 2017.    If you have any questions or concerns, please don't hesitate to call.    Thank you for letting us care for you,  Yue Aparicio LPN Clinical Care Coordinator  [facility]  [phone]

## 2017-07-22 RX ORDER — POTASSIUM CHLORIDE 750 MG/1
TABLET, EXTENDED RELEASE ORAL
Qty: 90 TABLET | Refills: 0 | Status: SHIPPED | OUTPATIENT
Start: 2017-07-22 | End: 2017-01-01 | Stop reason: SDUPTHER

## 2017-07-22 RX ORDER — CLONAZEPAM 0.5 MG/1
TABLET ORAL
Qty: 30 TABLET | Refills: 0 | Status: SHIPPED | OUTPATIENT
Start: 2017-07-22 | End: 2017-01-01

## 2017-08-02 NOTE — PROGRESS NOTES
Subjective:     THIS DOCUMENT WAS MADE IN PART WITH [employer] DICTATION SOFTWARE.  OCCASIONALLY THIS SOFTWARE WILL MISINTERPRET WORDS OR PHRASES.     Patient ID: Delaney Stephenson is a 77 y.o. female.    Chief Complaint: Hypertension (follow up )    HPI   This is a 77-year-old female here today following up with several medical problems.  She has a history of type 2 diabetes that has been stable and well-controlled by her report.  She follows with her endocrinologist Dr. Suarez.  Last A1c on file was 6.2.  She states in June it was 5.5.  She has a history of hypertension which has generally been okay but recently has had some systolic elevations.  She has a history of chronic kidney disease stage III and is followed by nephrology.  Her health maintenance chart is reviewed and nothing is marked as completed even though she states she follows with her endocrinologist regularly.    Arthritis legs, needs handicap license     Anxiety, stable on klonopin, takes 1/2 qhs    Active Ambulatory Problems     Diagnosis Date Noted    Essential hypertension     Hyperlipidemia     Insulin dependent type 2 diabetes mellitus, controlled 08/17/2012    At risk for falls 09/28/2012    Diabetic neuropathy 03/14/2014    CKD (chronic kidney disease) stage 3, GFR 30-59 ml/min 03/14/2014    Degenerative arthritis of knee 03/14/2014    DM type 2 (diabetes mellitus, type 2) 05/12/2017    Morbid obesity 05/12/2017     Resolved Ambulatory Problems     Diagnosis Date Noted    No Resolved Ambulatory Problems     Past Medical History:   Diagnosis Date    Adrenal gland neoplasm     ESSENTIAL HYPERTENSION     Hyperlipidemia     Renal insufficiency     Type 2 diabetes mellitus            Review of Systems   Constitutional: Negative for fatigue, fever and unexpected weight change.   HENT: Negative for sinus pressure and trouble swallowing.    Eyes: Positive for visual disturbance. Negative for pain.   Respiratory: Positive for shortness of  breath (improved). Negative for cough and chest tightness.    Cardiovascular: Negative for chest pain, palpitations and leg swelling.   Gastrointestinal: Negative for abdominal pain, blood in stool, constipation, diarrhea, nausea and vomiting.   Genitourinary: Negative for dysuria, frequency and hematuria.   Musculoskeletal: Positive for arthralgias and gait problem. Negative for myalgias and neck pain.   Skin: Negative for rash and wound.   Neurological: Positive for numbness. Negative for dizziness, tremors, syncope and headaches.   Psychiatric/Behavioral: Negative for dysphoric mood and sleep disturbance. The patient is not nervous/anxious.        Objective:       Vitals:    08/02/17 1135   BP: 130/78   BP Location: Left arm   Patient Position: Sitting   BP Method: Manual   Pulse: 66   Resp: 20   Weight: 93.6 kg (206 lb 5.6 oz)   Height: 5' (1.524 m)       Physical Exam   Constitutional: She is oriented to person, place, and time. She appears well-developed and well-nourished.   HENT:   Head: Normocephalic and atraumatic.   Eyes: No scleral icterus.   Cardiovascular: Normal rate, regular rhythm and normal heart sounds.    No murmur heard.  Pulmonary/Chest: Effort normal and breath sounds normal. No respiratory distress.   Abdominal: Soft. Bowel sounds are normal. She exhibits no distension. There is no tenderness.   Neurological: She is alert and oriented to person, place, and time.   Ambulatory with a walker.  Antalgic gait   Skin: Skin is dry. No rash noted. She is not diaphoretic.   Psychiatric: She has a normal mood and affect. Her behavior is normal.   Vitals reviewed.      Assessment:       1. Essential hypertension    2. Type 2 diabetes mellitus without complication, without long-term current use of insulin    3. CKD (chronic kidney disease) stage 3, GFR 30-59 ml/min    4. Diabetic polyneuropathy associated with type 2 diabetes mellitus        Plan:       Delaney was seen today for  hypertension.    Diagnoses and all orders for this visit:    Essential hypertension  Satisfactory  Type 2 diabetes mellitus without complication, without long-term current use of insulin  Appears well-controlled by her report but this is managed by Dr. Suarez.  CKD (chronic kidney disease) stage 3, GFR 30-59 ml/min  Stable  Diabetic polyneuropathy associated with type 2 diabetes mellitus  Stable  Osteoporosis screening  -     DXA Bone Density Spine And Hip; Future       We discussed the clonazepam and anxiety.  She has been on clonazepam for many years.  She didn't realize or forgot that it was controlled.  When I advised her of this she decided she wants to try to come off of it so we discussed gradually tapering down.  Either one quarter pill daily for 10 days or one half pill every other day for 10 days.  If she does fine then can cut back further after that.  If not and if she determines she still needs this medicine and she must see me every 3 months        rx for tdap  States had pneumovax after 64, and last years before prevnar.      Dr. Suarez endocrine, did labs in June  [place] [place]

## 2017-08-03 NOTE — TELEPHONE ENCOUNTER
----- Message from Jeremías Yadav sent at 8/3/2017 12:40 PM CDT -----  Contact: same  Patient called in and stated she was returning a call from earlier about some test results.  Patient call back number is [phone]

## 2017-08-07 NOTE — LETTER
August 7, 2017    Dr. Moises Suarez             [facility]  [address]  [facility] [address]  Phone: [phone] August 7, 2017     Patient: Delaney Stephenson    YOB: 1940   Date of Visit: 8/7/2017       To Whom It May Concern:    We are seeing Delaney Stephenson in the clinic at [facility].  Manpreet Greene MD is their PCP.  She has an outstanding lab/procedure at the time we reviewed their chart.  To help with our Health Maintenance records will you please supply the following:     []  Mammogram                                                                                        [x]  Outside Lab Results (urine micro, lipids, a1c, and cmp)   []  Dexa scans                                                      [x]  Eye Exam (any diagnosis of retinopathy)   []  Foot Exam                                                   [x]  Outside Immunizations (tetanus, zoster, pneumonia, flu - need dates or update in LINKS)                             Please Fax to [facility] at .    Thank you for your help.  If my Care Coordinator can be of any assistance, you can call GAIL AlbaCCC at [phone].     If you have any questions or concerns, please don't hesitate to call.    Sincerely,        Manpreet Greene MD

## 2017-08-07 NOTE — LETTER
August 7, 2017    Dr. Raleigh Martinez             [facility]  [address]  [facility] [address]  Phone: [phone] August 7, 2017     Patient: Delaney Stephenson    YOB: 1940   Date of Visit: 8/7/2017       To Whom It May Concern:    We are seeing Delaney Stephenson in the clinic at [facility].  Manpreet Greene MD is their PCP.  She has an outstanding lab/procedure at the time we reviewed their chart.  To help with our Health Maintenance records will you please supply the following:     []  Mammogram                                                []  Colonoscopy   []  Pap Smear                                                  []  Outside Lab Results   []  Dexa scans                                                   []  Eye Exam   [x]  Diabetic Foot Exam                                     [] Other___________   []  Outside Immunizations                             Please Fax to [facility] at .    Thank you for your help.  If my Care Coordinator can be of any assistance, you can call NAPOLEON Alba at [phone].     If you have any questions or concerns, please don't hesitate to call.    Sincerely,        Manpreet Greene MD

## 2017-08-07 NOTE — TELEPHONE ENCOUNTER
----- Message from Manpreet Greene MD sent at 8/2/2017  4:54 PM CDT -----  Followed by Dr. Suarez.  States she had recent labs.  Although I don't have a record of this.  I would prefer to request labs from Dr. Ryan Suarez before repeating any unnecessary steps.  She also follows regularly with the podiatrist, Dr. Martinez (Rohit? spelling).    Requested both

## 2017-08-09 PROBLEM — N28.1 COMPLEX RENAL CYST: Status: ACTIVE | Noted: 2017-01-01

## 2017-08-09 NOTE — PROGRESS NOTES
Subjective:       Patient ID: Delaney Stephenson is a 77 y.o. White female who presents for follow-up evaluation of Follow-up and Chronic Kidney Disease    HPI  Review of Systems    Objective:      Physical Exam    Assessment:       1. CKD (chronic kidney disease) stage 3, GFR 30-59 ml/min    2. Renal mass    3. Complex renal cyst        Plan:             CKD stable  Complex cyst--repeat renal u/s in 3 months from test date

## 2017-09-06 NOTE — PROGRESS NOTES
Refill Authorization Note     is requesting a refill authorization.    Brief assessment and rational for refill: APPROVE: pt req refill  Name of medication: hydralazine 10 mg / escitalopram 10 mg / ranitidine 150 mg   How patient will take medication: t1t po daily  Amount/Quantity of medication ordered: 90 days           Refills Authorized: Yes  If authorized number of refills: 1        Medication Therapy Plan: Pt last seen 1 month ago, f/u in 2 months.  Approve for 90d w/1 refills

## 2017-09-07 NOTE — PROGRESS NOTES
Subjective:       Patient ID: Delaney Stephenson is a 77 y.o. female.    Chief Complaint: Foot Pain (R side) and Foot Swelling    Right foot pain since Monday. Painful to ambulate. Started suddenly, no injury or fall. Moderate to severe pain, much worse with weight bearing and palpation. Generalized in whole foot. 2nd right toe red, swollen, and painful started today. She has taken tylenol without relief, yesterday she started to take ibuprofen 800 mg, some relief. No history of gout that she is aware of.  TETANUS VACCINE due on 01/04/1957  Zoster Vaccine due on 01/04/1999  Hemoglobin A1c due on 01/11/2017  Pneumococcal (65+)(2 of 2 - PPSV23) due on 05/24/2017  Lipid Panel due on 07/11/2017  Influenza Vaccine due on 08/01/2017  Eye Exam due on 08/16/2017  Urine Microalbumin due on 07/20/2017    Past Medical History:  Past Medical History:  No date: Adrenal gland neoplasm      Comment: states was evaluated and benign  No date: ESSENTIAL HYPERTENSION  No date: Hyperlipidemia  No date: Renal insufficiency  No date: Type 2 diabetes mellitus  Past Surgical History:  No date: APPENDECTOMY  No date: COLECTOMY      Comment: fistula  No date: tonsils  Review of patient's allergies indicates:   -- Codeine -- Nausea And Vomiting   -- Phenergan (promethazine) -- Other (See Comments)  Current Outpatient Prescriptions on File Prior to Visit:  aspirin (ECOTRIN) 81 MG EC tablet, Take 81 mg by mouth once daily.  , Disp: , Rfl:   clonazePAM (KLONOPIN) 0.5 MG tablet, TAKE 1/2 TABLET BY MOUTH TWICE DAILY, Disp: 30 tablet, Rfl: 0  escitalopram oxalate (LEXAPRO) 10 MG tablet, TAKE 1 TABLET BY MOUTH EVERY DAY, Disp: 90 tablet, Rfl: 1  fluticasone (FLONASE) 50 mcg/actuation nasal spray, USE TWO SPRAYS IN EACH NOSTRIL AS NEEDED, Disp: 3 Bottle, Rfl: 3  glipiZIDE (GLUCOTROL) 5 MG tablet, Take 5 mg by mouth daily with breakfast. EXTENDED RELEASE , Disp: , Rfl:   hydrALAZINE (APRESOLINE) 10 MG tablet, TAKE 1 TABLET BY MOUTH TWICE DAILY, Disp:  180 tablet, Rfl: 1  indapamide (LOZOL) 2.5 MG Tab, TAKE 1 TABLET BY MOUTH TWICE DAILY, Disp: 180 tablet, Rfl: 0  metformin (GLUCOPHAGE) 500 MG tablet, TAKE 1 TABLET BY MOUTH THREE TIMES DAILY WITH MEALS, Disp: 270 tablet, Rfl: 1  nadolol (CORGARD) 40 MG tablet, TAKE 1& 1/2 TABLETS BY MOUTH EVERY DAY, Disp: 135 tablet, Rfl: 1  pioglitazone (ACTOS) 15 MG tablet, TAKE 1 TABLET BY MOUTH EVERY DAY, Disp: 90 tablet, Rfl: 1  potassium chloride SA (K-DUR,KLOR-CON) 10 MEQ tablet, TAKE 1 TABLET BY MOUTH ONCE DAILY, Disp: 90 tablet, Rfl: 0  ranitidine (ZANTAC) 150 MG tablet, TAKE 1 TABLET BY MOUTH TWICE DAILY, Disp: 180 tablet, Rfl: 1  SENNA/FENNEL (NATURAL VEGETABLE LAXATIVE ORAL), Take 3 tablets by mouth 2 (two) times daily., Disp: , Rfl:   simvastatin (ZOCOR) 40 MG tablet, TAKE 1 TABLET BY MOUTH EVERY EVENING, Disp: 90 tablet, Rfl: 1  (DISCONTINUED) glipiZIDE (GLUCOTROL) 5 MG TR24, , Disp: , Rfl:   triamcinolone acetonide 0.1% (KENALOG) 0.1 % cream, Apply topically 2 (two) times daily., Disp: 60 g, Rfl: 3    No current facility-administered medications on file prior to visit.     Social History    Marital status:              Spouse name:                       Years of education:                 Number of children:               Occupational History    None on file    Social History Main Topics    Smoking status: Never Smoker                                                                Smokeless tobacco: Never Used                        Alcohol use: Not on file     Drug use: Not on file     Sexual activity: Not on file          Other Topics            Concern    None on file    Social History Narrative    None on file      Review of patient's family history indicates:    Heart disease                  Mother                            Review of Systems   Constitutional: Negative for chills and fever.   Respiratory: Negative.  Negative for cough and shortness of breath.    Cardiovascular: Negative.     Gastrointestinal: Negative.    Genitourinary: Negative.    Musculoskeletal: Positive for arthralgias and joint swelling.   Skin: Positive for color change.   Neurological: Negative.    Hematological: Negative.  Negative for adenopathy. Does not bruise/bleed easily.       Objective:      Physical Exam   Constitutional: No distress.   HENT:   Head: Normocephalic and atraumatic.   Cardiovascular: Normal rate.    Pulmonary/Chest: Effort normal.   Musculoskeletal:        Feet:    Skin: She is not diaphoretic.   Vitals reviewed.      Assessment:       1. Right foot pain        Plan:       1. Right foot pain  No known injury. Will check xray and labs to try to determine cause. Mobic 15 mg daily x 10 days, no otc ibuprofen. Tylenol is ok.   - X-Ray Foot Complete Right; Future  - Uric acid; Future  - CBC auto differential; Future  - Sedimentation rate, manual; Future  - meloxicam (MOBIC) 15 MG tablet; Take 1 tablet (15 mg total) by mouth once daily.  Dispense: 10 tablet; Refill: 0

## 2017-09-11 NOTE — TELEPHONE ENCOUNTER
----- Message from Alfreda Olmstead sent at 9/11/2017  7:50 AM CDT -----  Contact:  call //[phone]   Calling   For test results // please call

## 2017-09-25 NOTE — PROGRESS NOTES
Subjective:       Patient ID: Delaney Stephenson is a 77 y.o. White female who presents for follow-up evaluation of Chronic Kidney Disease    HPI     She reports she is doing well except she has had gout twice since last visit and one episode earlier in March. Her  remains in a NH and she was unable to visit him as she could not walk. No edema of LE and no SOB but when walking from the lobby to the exam room she needed to pause and catch her breath. Her BS are running well. No hematuria.     Review of Systems   Constitutional: Positive for fatigue. Negative for activity change, appetite change and unexpected weight change.   HENT: Negative for facial swelling.    Eyes: Negative for visual disturbance.   Respiratory: Positive for shortness of breath. Negative for cough and wheezing.    Cardiovascular: Negative for chest pain and leg swelling.   Gastrointestinal: Negative for abdominal distention.   Genitourinary: Negative for difficulty urinating, dysuria, frequency and hematuria.   Musculoskeletal: Positive for arthralgias.   Neurological: Negative for weakness and headaches.       Objective:      Physical Exam   Constitutional: She is oriented to person, place, and time. She appears well-nourished.   HENT:   Mouth/Throat: Oropharynx is clear and moist.   Neck: No JVD present.   Cardiovascular: S1 normal and S2 normal.  Exam reveals no friction rub.    Pulmonary/Chest: Breath sounds normal. She has no wheezes. She has no rales.   Abdominal: Soft.   Musculoskeletal: She exhibits no edema.   Neurological: She is alert and oriented to person, place, and time.   Skin: Skin is warm and dry.   Psychiatric: She has a normal mood and affect.   Vitals reviewed.      Assessment:       1. Renal mass    2. CKD (chronic kidney disease) stage 3, GFR 30-59 ml/min    3. Essential hypertension    4. Type 2 diabetes mellitus with diabetic nephropathy, without long-term current use of insulin    5. Morbid obesity due to excess  calories        Plan:             Renal mass--obtain CT scan    HTN is controlled    CKD is stable    DM is controlled. Hold metformin for IV contrast      RTC pending CT scan results

## 2017-10-13 NOTE — TELEPHONE ENCOUNTER
Informed patient re: lab.  She reports she  had [facility] at Dr. Suarez's this am if you want me to request those results on Monday.     Advised should be contacted with CT results by Monday evening.

## 2017-10-13 NOTE — TELEPHONE ENCOUNTER
Please inform pt that her kidney blood test looked fine.    I am waiting to talk to a radiologist to discuss her CT scan results with him. I am expecting a phone call back today or Monday so we will call her Monday at the latest to review results    Thank you

## 2017-10-17 NOTE — TELEPHONE ENCOUNTER
She can take the colchine for up to a week or until it upsets her stomach, whichever comes first. She should not take her allopurinol during a gout flare.

## 2017-10-17 NOTE — TELEPHONE ENCOUNTER
----- Message from Nicole Henson sent at 10/17/2017 11:36 AM CDT -----  Contact: [phone]  Patient is requesting a call back from the nurse stated she having gout appt, she have a question about medication use.    Please call the patient upon request at phone number [phone].

## 2017-10-17 NOTE — TELEPHONE ENCOUNTER
Spoke with pt she understands if she is having a gout flare up she should stop allopurinol and start colchicine. She can stay this for up to 7 days. If she starts having gi upset she should stop  colchicine she can restart allopurinol.  Pt understands she should see Dr. Asher.

## 2017-10-17 NOTE — TELEPHONE ENCOUNTER
Having bouts of the gout.   She wants to know if she can take colchicine for her flare up of gout. And if so for how long?  Does she take allopurinol at same time as colchicine?

## 2017-10-18 NOTE — TELEPHONE ENCOUNTER
Spoke with pt and gave her results of recent CT scan regarding her kidney mass. Pt needs to see Urology for further evaluation and she is requesting Dr. Landeros, please schedule. Appt must be after October 27th. Thank you

## 2017-10-19 NOTE — TELEPHONE ENCOUNTER
Spoke to pt and booked her an apt for november 6th at 2:30pm. I have a Tuesday afternoon blocked to do surgery for the pt.

## 2017-11-06 NOTE — LETTER
November 6, 2017      Adonay Asher MD  [address] [facility] [address]  [address]  [facility] [address]           [facility]  [address] [facility] [address] [place] [address]  Phone: [phone]          Patient: Delaney Stephenson   MR Number: [id number]   YOB: 1940   Date of Visit: 11/6/2017       Dear Dr. Adonay Asher:    Thank you for referring Delaney Stephenson to me for evaluation. Attached you will find relevant portions of my assessment and plan of care.    If you have questions, please do not hesitate to call me. I look forward to following Delaney Stephenson along with you.    Sincerely,    CONNER Landeros MD    Enclosure  CC:  No Recipients    If you would like to receive this communication electronically, please contact [email or web address] or [phone] to request more information on [employer] Link access.    For providers and/or their staff who would like to refer a patient to [facility], please contact us through our one-stop-shop provider referral line, [facility], at [phone].    If you feel you have received this communication in error or would no longer like to receive these types of communications, please e-mail [email or web address]

## 2017-11-06 NOTE — PROGRESS NOTES
Subjective:       Patient ID: Delaney Stephenson is a 77 y.o. female.    Chief Complaint: Kidney Tumor    HPI     77 year old with incidentally discovered right renal mass.  Measure 2 cm anterior mid right kidneys.  She has no complaints.  She denies hematuria and dysuria and no flank pain.  I reviewed the images with her.  Mass enhances and is likely a malignancy.  She is opposed to surgery.  We discussed other treatment options including percutaneous cryotherapy vs. observation.  We discussed the small risk of metastasis at this tumor size.    Urine dipstick shows negative for all components.    Past Medical History:   Diagnosis Date    Adrenal gland neoplasm     states was evaluated and benign    ESSENTIAL HYPERTENSION     Hyperlipidemia     Renal insufficiency     Type 2 diabetes mellitus      Past Surgical History:   Procedure Laterality Date    APPENDECTOMY      COLECTOMY      fistula    tonsils         Current Outpatient Prescriptions:     allopurinol (ZYLOPRIM) 100 MG tablet, Take 1 tablet (100 mg total) by mouth once daily., Disp: 30 tablet, Rfl: 4    aspirin (ECOTRIN) 81 MG EC tablet, Take 81 mg by mouth once daily.  , Disp: , Rfl:     colchicine 0.6 mg tablet, One po QDAY X 2 weeks. Start at same time as allopurinol. Once 2 weeks is complete continue allopurinol QD, Disp: 30 tablet, Rfl: 4    escitalopram oxalate (LEXAPRO) 10 MG tablet, TAKE 1 TABLET BY MOUTH EVERY DAY, Disp: 90 tablet, Rfl: 1    fluticasone (FLONASE) 50 mcg/actuation nasal spray, USE TWO SPRAYS IN EACH NOSTRIL AS NEEDED, Disp: 3 Bottle, Rfl: 3    glipiZIDE (GLUCOTROL) 5 MG tablet, Take 5 mg by mouth daily with breakfast. EXTENDED RELEASE , Disp: , Rfl:     hydrALAZINE (APRESOLINE) 10 MG tablet, TAKE 1 TABLET BY MOUTH TWICE DAILY, Disp: 180 tablet, Rfl: 1    ibuprofen (ADVIL,MOTRIN) 400 MG tablet, Take 400 mg by mouth every 6 (six) hours as needed for Other., Disp: , Rfl:     indapamide (LOZOL) 2.5 MG Tab, TAKE 1 TABLET BY  MOUTH TWICE DAILY, Disp: 180 tablet, Rfl: 0    metformin (GLUCOPHAGE) 500 MG tablet, TAKE 1 TABLET BY MOUTH THREE TIMES DAILY WITH MEALS (Patient taking differently: TAKE 1 TABLET BY MOUTH 2 TIMES DAILY WITH MEALS), Disp: 270 tablet, Rfl: 1    nadolol (CORGARD) 40 MG tablet, TAKE 1& 1/2 TABLETS BY MOUTH EVERY DAY, Disp: 135 tablet, Rfl: 1    pioglitazone (ACTOS) 15 MG tablet, TAKE 1 TABLET BY MOUTH EVERY DAY, Disp: 90 tablet, Rfl: 1    potassium chloride SA (K-DUR,KLOR-CON) 10 MEQ tablet, TAKE 1 TABLET BY MOUTH ONCE DAILY, Disp: 90 tablet, Rfl: 0    ranitidine (ZANTAC) 150 MG tablet, TAKE 1 TABLET BY MOUTH TWICE DAILY, Disp: 180 tablet, Rfl: 1    SENNA/FENNEL (NATURAL VEGETABLE LAXATIVE ORAL), Take 3 tablets by mouth 2 (two) times daily., Disp: , Rfl:     simvastatin (ZOCOR) 40 MG tablet, TAKE 1 TABLET BY MOUTH EVERY EVENING, Disp: 90 tablet, Rfl: 2    triamcinolone acetonide 0.1% (KENALOG) 0.1 % cream, Apply topically 2 (two) times daily., Disp: 60 g, Rfl: 3    Review of Systems   Constitutional: Negative for fever.   Eyes: Negative for visual disturbance.   Respiratory: Negative for shortness of breath.    Cardiovascular: Negative for chest pain.   Gastrointestinal: Negative for abdominal pain and nausea.   Genitourinary: Negative for dysuria, flank pain and hematuria.   Musculoskeletal: Positive for arthralgias and gait problem.   Skin: Negative for rash.   Neurological: Negative for seizures.   Psychiatric/Behavioral: Negative for confusion.       Objective:      Physical Exam   Constitutional: She is oriented to person, place, and time. She appears well-developed and well-nourished.   HENT:   Head: Normocephalic.   Eyes: Conjunctivae and EOM are normal.   Neck: Normal range of motion.   Cardiovascular: Normal rate.    Pulmonary/Chest: Effort normal.   Abdominal: Soft. She exhibits no distension and no mass. There is no tenderness.   Genitourinary:   Genitourinary Comments: Bladder non-tender and  nondistended  No CVA tenderness   Musculoskeletal: She exhibits no edema.   Neurological: She is alert and oriented to person, place, and time.   Skin: Skin is warm and dry. No rash noted. No erythema.   Psychiatric: She has a normal mood and affect. Her behavior is normal.   Vitals reviewed.      Assessment:       1. Renal mass        Plan:       Renal mass  -     POCT URINE DIPSTICK WITHOUT MICROSCOPE      Will consult IR about possible percutaneous treatment.

## 2017-11-06 NOTE — PROGRESS NOTES
Refill Authorization Note     is requesting a refill authorization.    Brief assessment and rational for refill: APPROVE: prr  Name of medication: SIMVASTATIN 40MG TABLETS  How patient will take medication: t1t po daily   Amount/Quantity of medication ordered: 90d   Medication reconciliation completed: No        Refills Authorized: Yes  If authorized number of refills: 2        Medication Therapy Plan: Recent lipids in scanned media.  Geo 9 mo.  Will RTC in 4 weeks  Comments:   No results found for: CHOL  Lab Results   Component Value Date    HDL 46 07/11/2016     Lab Results   Component Value Date    LDLCALC 25 07/11/2016     Lab Results   Component Value Date    TRIG 158 07/11/2016     No results found for: CHOLHDL

## 2017-11-17 NOTE — LETTER
November 17, 2017    Dr. Kirill Conde             [facility]  [address]  [facility] [address]  Phone: [phone] November 17, 2017     Patient: Delaney Stephenson    YOB: 1940   Date of Visit: 11/17/2017       To Whom It May Concern:    We are seeing Delaney Stephenson in the clinic at [facility].  Manpreet Greene MD is their PCP.  She has an outstanding lab/procedure at the time we reviewed their chart.  To help with our Health Maintenance records will you please supply the following report(s):     []  Mammogram                                                []  Colonoscopy   []  Pap Smear                                                  []  Outside Lab Results   []  Dexa scans                                                      [x]  Eye Exam (done this summer - any Dx of retinopathy?)   []  Foot Exam                                                     [] Other___________   []  Outside Immunizations                             Please Fax to [facility] at .    Thank you for your help.  If my Care Coordinator can be of any assistance, you can call [facility] at [phone].     If you have any questions or concerns, please don't hesitate to call.    Sincerely,        Manpreet Greene MD

## 2017-11-24 PROBLEM — J90 PLEURAL EFFUSION: Status: ACTIVE | Noted: 2017-01-01

## 2017-11-24 PROBLEM — R91.1 LUNG NODULE: Status: ACTIVE | Noted: 2017-01-01

## 2017-11-24 PROBLEM — J18.9 PNEUMONIA DUE TO INFECTIOUS ORGANISM: Status: ACTIVE | Noted: 2017-01-01

## 2017-11-26 PROBLEM — J18.9 PNEUMONIA DUE TO INFECTIOUS ORGANISM: Status: RESOLVED | Noted: 2017-01-01 | Resolved: 2017-01-01

## 2017-12-05 PROBLEM — K59.00 CONSTIPATION: Status: ACTIVE | Noted: 2017-01-01

## 2017-12-05 PROBLEM — Z75.8 DISCHARGE PLANNING ISSUES: Status: ACTIVE | Noted: 2017-01-01

## 2017-12-07 PROBLEM — K59.00 CONSTIPATION: Status: RESOLVED | Noted: 2017-01-01 | Resolved: 2017-01-01

## 2017-12-08 PROBLEM — C43.9 MALIGNANT MELANOMA, METASTATIC: Status: ACTIVE | Noted: 2017-01-01

## 2017-12-11 PROBLEM — E11.21 TYPE 2 DIABETES MELLITUS WITH DIABETIC NEPHROPATHY, WITHOUT LONG-TERM CURRENT USE OF INSULIN: Status: ACTIVE | Noted: 2017-05-12

## 2017-12-12 PROBLEM — R06.02 SOB (SHORTNESS OF BREATH): Status: ACTIVE | Noted: 2017-01-01

## 2022-03-28 NOTE — LETTER
May 12, 2017      Moises Suarez MD  [address]  [facility] [address]           [facility]  [address] [facility] [address]  [facility] [address]  Phone: [phone]          Patient: Delaney Stephenson   MR Number: [id number]   YOB: 1940   Date of Visit: 5/9/2017       Dear Dr. Moises Suarez:    Thank you for referring Delaney Stephenson to me for evaluation. Attached you will find relevant portions of my assessment and plan of care.    If you have questions, please do not hesitate to call me. I look forward to following Delaney Stephenson along with you.    Sincerely,    Christo Starkey  CC:  No Recipients    If you would like to receive this communication electronically, please contact [email or web address] or [phone] to request more information on [employer] Link access.    For providers and/or their staff who would like to refer a patient to [facility], please contact us through our one-stop-shop provider referral line, Anne Gonzalez, at [phone].    If you feel you have received this communication in error or would no longer like to receive these types of communications, please e-mail [email or web address]          negative

## 2023-09-04 NOTE — Clinical Note
Followed by Dr. Suarez.  States she had recent labs.  Although I don't have a record of this.  I would prefer to request labs from Dr. Ryan Suarez before repeating any unnecessary steps.  She also follows regularly with the podiatrist, Dr. Martinez (Rohit? spelling).
0 = understands/communicates without difficulty

## 2024-03-30 NOTE — TELEPHONE ENCOUNTER
----- Message from Tracy Ortez sent at 10/5/2017 10:33 AM CDT -----  Contact: Patient  Delaney, patient [phone], Calling about just starting back on her diabetic medications after her Cat Scan. But blood work is scheduled for Saturday. Should she keep that appointment or have the test done on Friday 10/6/17? Please advise.willie.     Heterosexual